# Patient Record
Sex: MALE | Race: WHITE | NOT HISPANIC OR LATINO | URBAN - METROPOLITAN AREA
[De-identification: names, ages, dates, MRNs, and addresses within clinical notes are randomized per-mention and may not be internally consistent; named-entity substitution may affect disease eponyms.]

---

## 2017-07-18 ENCOUNTER — EMERGENCY (EMERGENCY)
Facility: HOSPITAL | Age: 72
LOS: 1 days | Discharge: HOME | End: 2017-07-18
Attending: EMERGENCY MEDICINE
Payer: MEDICARE

## 2017-07-18 VITALS
RESPIRATION RATE: 16 BRPM | WEIGHT: 160.06 LBS | TEMPERATURE: 99 F | HEART RATE: 75 BPM | OXYGEN SATURATION: 97 % | HEIGHT: 68 IN | DIASTOLIC BLOOD PRESSURE: 75 MMHG | SYSTOLIC BLOOD PRESSURE: 147 MMHG

## 2017-07-18 PROCEDURE — G0168: CPT

## 2017-07-18 PROCEDURE — 99284 EMERGENCY DEPT VISIT MOD MDM: CPT | Mod: 25

## 2017-07-18 RX ORDER — TETANUS TOXOID, REDUCED DIPHTHERIA TOXOID AND ACELLULAR PERTUSSIS VACCINE, ADSORBED 5; 2.5; 8; 8; 2.5 [IU]/.5ML; [IU]/.5ML; UG/.5ML; UG/.5ML; UG/.5ML
0.5 SUSPENSION INTRAMUSCULAR ONCE
Qty: 0 | Refills: 0 | Status: COMPLETED | OUTPATIENT
Start: 2017-07-18 | End: 2017-07-18

## 2017-07-18 NOTE — ED ADULT TRIAGE NOTE - CHIEF COMPLAINT QUOTE
As Per EMS pt was trying to walk with assistance of son and fell. Pt denies dizziness. Lac noted under right eye and above right eye. pt Awake and alert.

## 2017-07-19 VITALS
DIASTOLIC BLOOD PRESSURE: 88 MMHG | RESPIRATION RATE: 16 BRPM | TEMPERATURE: 98 F | OXYGEN SATURATION: 97 % | HEART RATE: 72 BPM | SYSTOLIC BLOOD PRESSURE: 138 MMHG

## 2017-07-19 LAB
ALBUMIN SERPL ELPH-MCNC: 4 G/DL — SIGNIFICANT CHANGE UP (ref 3.3–5.2)
ALP SERPL-CCNC: 98 U/L — SIGNIFICANT CHANGE UP (ref 40–120)
ALT FLD-CCNC: 25 U/L — SIGNIFICANT CHANGE UP
ANION GAP SERPL CALC-SCNC: 11 MMOL/L — SIGNIFICANT CHANGE UP (ref 5–17)
AST SERPL-CCNC: 30 U/L — SIGNIFICANT CHANGE UP
BASOPHILS # BLD AUTO: 0 K/UL — SIGNIFICANT CHANGE UP (ref 0–0.2)
BASOPHILS NFR BLD AUTO: 0.2 % — SIGNIFICANT CHANGE UP (ref 0–2)
BILIRUB SERPL-MCNC: 0.3 MG/DL — LOW (ref 0.4–2)
BUN SERPL-MCNC: 17 MG/DL — SIGNIFICANT CHANGE UP (ref 8–20)
CALCIUM SERPL-MCNC: 8.9 MG/DL — SIGNIFICANT CHANGE UP (ref 8.6–10.2)
CHLORIDE SERPL-SCNC: 99 MMOL/L — SIGNIFICANT CHANGE UP (ref 98–107)
CO2 SERPL-SCNC: 32 MMOL/L — HIGH (ref 22–29)
CREAT SERPL-MCNC: 0.31 MG/DL — LOW (ref 0.5–1.3)
EOSINOPHIL # BLD AUTO: 0.3 K/UL — SIGNIFICANT CHANGE UP (ref 0–0.5)
EOSINOPHIL NFR BLD AUTO: 3.8 % — SIGNIFICANT CHANGE UP (ref 0–5)
GLUCOSE SERPL-MCNC: 134 MG/DL — HIGH (ref 70–115)
HCT VFR BLD CALC: 45.5 % — SIGNIFICANT CHANGE UP (ref 42–52)
HGB BLD-MCNC: 14.2 G/DL — SIGNIFICANT CHANGE UP (ref 14–18)
LYMPHOCYTES # BLD AUTO: 1 K/UL — SIGNIFICANT CHANGE UP (ref 1–4.8)
LYMPHOCYTES # BLD AUTO: 12 % — LOW (ref 20–55)
MCHC RBC-ENTMCNC: 30.1 PG — SIGNIFICANT CHANGE UP (ref 27–31)
MCHC RBC-ENTMCNC: 31.2 G/DL — LOW (ref 32–36)
MCV RBC AUTO: 96.6 FL — HIGH (ref 80–94)
MONOCYTES # BLD AUTO: 0.8 K/UL — SIGNIFICANT CHANGE UP (ref 0–0.8)
MONOCYTES NFR BLD AUTO: 9.8 % — SIGNIFICANT CHANGE UP (ref 3–10)
NEUTROPHILS # BLD AUTO: 6.2 K/UL — SIGNIFICANT CHANGE UP (ref 1.8–8)
NEUTROPHILS NFR BLD AUTO: 74 % — HIGH (ref 37–73)
PLATELET # BLD AUTO: 214 K/UL — SIGNIFICANT CHANGE UP (ref 150–400)
POTASSIUM SERPL-MCNC: 4 MMOL/L — SIGNIFICANT CHANGE UP (ref 3.5–5.3)
POTASSIUM SERPL-SCNC: 4 MMOL/L — SIGNIFICANT CHANGE UP (ref 3.5–5.3)
PROT SERPL-MCNC: 7.8 G/DL — SIGNIFICANT CHANGE UP (ref 6.6–8.7)
RBC # BLD: 4.71 M/UL — SIGNIFICANT CHANGE UP (ref 4.6–6.2)
RBC # FLD: 14.4 % — SIGNIFICANT CHANGE UP (ref 11–15.6)
SODIUM SERPL-SCNC: 142 MMOL/L — SIGNIFICANT CHANGE UP (ref 135–145)
WBC # BLD: 8.4 K/UL — SIGNIFICANT CHANGE UP (ref 4.8–10.8)
WBC # FLD AUTO: 8.4 K/UL — SIGNIFICANT CHANGE UP (ref 4.8–10.8)

## 2017-07-19 PROCEDURE — 99284 EMERGENCY DEPT VISIT MOD MDM: CPT | Mod: 25

## 2017-07-19 PROCEDURE — 70450 CT HEAD/BRAIN W/O DYE: CPT | Mod: 26

## 2017-07-19 PROCEDURE — 85027 COMPLETE CBC AUTOMATED: CPT

## 2017-07-19 PROCEDURE — 90471 IMMUNIZATION ADMIN: CPT

## 2017-07-19 PROCEDURE — 90715 TDAP VACCINE 7 YRS/> IM: CPT

## 2017-07-19 PROCEDURE — 80053 COMPREHEN METABOLIC PANEL: CPT

## 2017-07-19 PROCEDURE — 36415 COLL VENOUS BLD VENIPUNCTURE: CPT

## 2017-07-19 PROCEDURE — 70450 CT HEAD/BRAIN W/O DYE: CPT

## 2017-07-19 PROCEDURE — 12013 RPR F/E/E/N/L/M 2.6-5.0 CM: CPT

## 2017-07-19 RX ADMIN — TETANUS TOXOID, REDUCED DIPHTHERIA TOXOID AND ACELLULAR PERTUSSIS VACCINE, ADSORBED 0.5 MILLILITER(S): 5; 2.5; 8; 8; 2.5 SUSPENSION INTRAMUSCULAR at 00:52

## 2017-07-19 NOTE — ED PROVIDER NOTE - MEDICAL DECISION MAKING DETAILS
A 71 year old male pt presents to the ED s/p fall. Will CT head, repair laceration, and re-evaluate.

## 2017-07-19 NOTE — ED PROVIDER NOTE - PROGRESS NOTE DETAILS
labs and imaging reviewed.  wound closed.   d/c home with family  pt at baseline. son states pt fell and then passed out.

## 2017-07-19 NOTE — ED PROVIDER NOTE - OBJECTIVE STATEMENT
A 71 year old male pt with a hx of muscular dystrophy presents to the ED s/p fall. 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 Today, the pt was on his toilet when he states that the next thing he knows he was on the ground. He is unsure if he experienced LOC but did hit his head, suffering a laceration to the right side of his head. Pt is unsure if he is on blood thinners but does have a hx of stent placement. No further complaints at this time.

## 2017-07-19 NOTE — ED PROCEDURE NOTE - PROCEDURE ADDITIONAL DETAILS
five 5.0 chromic gut placed with dermabond applied to the corner of the wound as it was a flap shaped lac at the tail end

## 2017-07-19 NOTE — ED ADULT NURSE NOTE - OBJECTIVE STATEMENT
pt A&ox4, son at bedside, as per son pt got up to go to the bathroom and got dizzy and fell and hit his head pt thinks he had LOC, resp even and unlabored no distress noted, pt a poor historian unable to give medical hx, pt has lac to above right eye

## 2019-01-01 ENCOUNTER — INPATIENT (INPATIENT)
Facility: HOSPITAL | Age: 74
LOS: 1 days | DRG: 189 | End: 2019-06-29
Attending: INTERNAL MEDICINE | Admitting: HOSPITALIST
Payer: MEDICARE

## 2019-01-01 VITALS
HEIGHT: 72 IN | TEMPERATURE: 98 F | HEART RATE: 78 BPM | SYSTOLIC BLOOD PRESSURE: 105 MMHG | DIASTOLIC BLOOD PRESSURE: 68 MMHG | WEIGHT: 149.91 LBS | RESPIRATION RATE: 22 BRPM | OXYGEN SATURATION: 79 %

## 2019-01-01 DIAGNOSIS — J96.01 ACUTE RESPIRATORY FAILURE WITH HYPOXIA: ICD-10-CM

## 2019-01-01 DIAGNOSIS — G71.00 MUSCULAR DYSTROPHY, UNSPECIFIED: ICD-10-CM

## 2019-01-01 DIAGNOSIS — I10 ESSENTIAL (PRIMARY) HYPERTENSION: ICD-10-CM

## 2019-01-01 DIAGNOSIS — I95.9 HYPOTENSION, UNSPECIFIED: ICD-10-CM

## 2019-01-01 DIAGNOSIS — J96.91 RESPIRATORY FAILURE, UNSPECIFIED WITH HYPOXIA: ICD-10-CM

## 2019-01-01 DIAGNOSIS — E43 UNSPECIFIED SEVERE PROTEIN-CALORIE MALNUTRITION: ICD-10-CM

## 2019-01-01 DIAGNOSIS — G93.40 ENCEPHALOPATHY, UNSPECIFIED: ICD-10-CM

## 2019-01-01 DIAGNOSIS — E46 UNSPECIFIED PROTEIN-CALORIE MALNUTRITION: ICD-10-CM

## 2019-01-01 DIAGNOSIS — E87.1 HYPO-OSMOLALITY AND HYPONATREMIA: ICD-10-CM

## 2019-01-01 DIAGNOSIS — Z71.89 OTHER SPECIFIED COUNSELING: ICD-10-CM

## 2019-01-01 LAB
ALBUMIN SERPL ELPH-MCNC: 3.7 G/DL — SIGNIFICANT CHANGE UP (ref 3.3–5.2)
ALP SERPL-CCNC: 91 U/L — SIGNIFICANT CHANGE UP (ref 40–120)
ALT FLD-CCNC: 13 U/L — SIGNIFICANT CHANGE UP
ANION GAP SERPL CALC-SCNC: 12 MMOL/L — SIGNIFICANT CHANGE UP (ref 5–17)
ANION GAP SERPL CALC-SCNC: 8 MMOL/L — SIGNIFICANT CHANGE UP (ref 5–17)
ANISOCYTOSIS BLD QL: SLIGHT — SIGNIFICANT CHANGE UP
APPEARANCE UR: CLEAR — SIGNIFICANT CHANGE UP
APTT BLD: 24.4 SEC — LOW (ref 27.5–36.3)
AST SERPL-CCNC: 28 U/L — SIGNIFICANT CHANGE UP
BASE EXCESS BLDA CALC-SCNC: 10 MMOL/L — HIGH (ref -2–2)
BASOPHILS # BLD AUTO: 0 K/UL — SIGNIFICANT CHANGE UP (ref 0–0.2)
BASOPHILS # BLD AUTO: 0.02 K/UL — SIGNIFICANT CHANGE UP (ref 0–0.2)
BASOPHILS NFR BLD AUTO: 0 % — SIGNIFICANT CHANGE UP (ref 0–2)
BASOPHILS NFR BLD AUTO: 0.3 % — SIGNIFICANT CHANGE UP (ref 0–2)
BILIRUB SERPL-MCNC: 0.5 MG/DL — SIGNIFICANT CHANGE UP (ref 0.4–2)
BILIRUB UR-MCNC: NEGATIVE — SIGNIFICANT CHANGE UP
BLOOD GAS COMMENTS ARTERIAL: SIGNIFICANT CHANGE UP
BUN SERPL-MCNC: 12 MG/DL — SIGNIFICANT CHANGE UP (ref 8–20)
BUN SERPL-MCNC: 8 MG/DL — SIGNIFICANT CHANGE UP (ref 8–20)
CALCIUM SERPL-MCNC: 8.2 MG/DL — LOW (ref 8.6–10.2)
CALCIUM SERPL-MCNC: 9.5 MG/DL — SIGNIFICANT CHANGE UP (ref 8.6–10.2)
CHLORIDE SERPL-SCNC: 74 MMOL/L — LOW (ref 98–107)
CHLORIDE SERPL-SCNC: 84 MMOL/L — LOW (ref 98–107)
CK SERPL-CCNC: 27 U/L — LOW (ref 30–200)
CO2 SERPL-SCNC: 36 MMOL/L — HIGH (ref 22–29)
CO2 SERPL-SCNC: 39 MMOL/L — HIGH (ref 22–29)
COLOR SPEC: YELLOW — SIGNIFICANT CHANGE UP
CREAT ?TM UR-MCNC: 11 MG/DL — SIGNIFICANT CHANGE UP
CREAT SERPL-MCNC: 0.23 MG/DL — LOW (ref 0.5–1.3)
CREAT SERPL-MCNC: <0.2 MG/DL — LOW (ref 0.5–1.3)
CULTURE RESULTS: NO GROWTH — SIGNIFICANT CHANGE UP
DIFF PNL FLD: ABNORMAL
EOSINOPHIL # BLD AUTO: 0 K/UL — SIGNIFICANT CHANGE UP (ref 0–0.5)
EOSINOPHIL # BLD AUTO: 0.03 K/UL — SIGNIFICANT CHANGE UP (ref 0–0.5)
EOSINOPHIL NFR BLD AUTO: 0 % — SIGNIFICANT CHANGE UP (ref 0–6)
EOSINOPHIL NFR BLD AUTO: 0.5 % — SIGNIFICANT CHANGE UP (ref 0–6)
EPI CELLS # UR: SIGNIFICANT CHANGE UP
GAS PNL BLDA: SIGNIFICANT CHANGE UP
GIANT PLATELETS BLD QL SMEAR: PRESENT — SIGNIFICANT CHANGE UP
GLUCOSE SERPL-MCNC: 130 MG/DL — HIGH (ref 70–115)
GLUCOSE SERPL-MCNC: 77 MG/DL — SIGNIFICANT CHANGE UP (ref 70–115)
GLUCOSE UR QL: 1000 MG/DL
HCO3 BLDA-SCNC: 34 MMOL/L — HIGH (ref 20–26)
HCT VFR BLD CALC: 48.1 % — SIGNIFICANT CHANGE UP (ref 39–50)
HCT VFR BLD CALC: 52.4 % — HIGH (ref 39–50)
HGB BLD-MCNC: 14.9 G/DL — SIGNIFICANT CHANGE UP (ref 13–17)
HGB BLD-MCNC: 16.6 G/DL — SIGNIFICANT CHANGE UP (ref 13–17)
HOROWITZ INDEX BLDA+IHG-RTO: SIGNIFICANT CHANGE UP
IMM GRANULOCYTES NFR BLD AUTO: 0.3 % — SIGNIFICANT CHANGE UP (ref 0–1.5)
INR BLD: 1.14 RATIO — SIGNIFICANT CHANGE UP (ref 0.88–1.16)
KETONES UR-MCNC: ABNORMAL
LACTATE BLDV-MCNC: 1.7 MMOL/L — SIGNIFICANT CHANGE UP (ref 0.5–2)
LEGIONELLA AG UR QL: NEGATIVE — SIGNIFICANT CHANGE UP
LEUKOCYTE ESTERASE UR-ACNC: NEGATIVE — SIGNIFICANT CHANGE UP
LYMPHOCYTES # BLD AUTO: 0.18 K/UL — LOW (ref 1–3.3)
LYMPHOCYTES # BLD AUTO: 0.65 K/UL — LOW (ref 1–3.3)
LYMPHOCYTES # BLD AUTO: 11.2 % — LOW (ref 13–44)
LYMPHOCYTES # BLD AUTO: 2.6 % — LOW (ref 13–44)
MACROCYTES BLD QL: SLIGHT — SIGNIFICANT CHANGE UP
MAGNESIUM SERPL-MCNC: 1.5 MG/DL — LOW (ref 1.6–2.6)
MAGNESIUM SERPL-MCNC: 1.6 MG/DL — SIGNIFICANT CHANGE UP (ref 1.6–2.6)
MANUAL SMEAR VERIFICATION: SIGNIFICANT CHANGE UP
MCHC RBC-ENTMCNC: 29.5 PG — SIGNIFICANT CHANGE UP (ref 27–34)
MCHC RBC-ENTMCNC: 29.7 PG — SIGNIFICANT CHANGE UP (ref 27–34)
MCHC RBC-ENTMCNC: 31 GM/DL — LOW (ref 32–36)
MCHC RBC-ENTMCNC: 31.7 GM/DL — LOW (ref 32–36)
MCV RBC AUTO: 93.9 FL — SIGNIFICANT CHANGE UP (ref 80–100)
MCV RBC AUTO: 95.2 FL — SIGNIFICANT CHANGE UP (ref 80–100)
MICROCYTES BLD QL: SLIGHT — SIGNIFICANT CHANGE UP
MONOCYTES # BLD AUTO: 0.66 K/UL — SIGNIFICANT CHANGE UP (ref 0–0.9)
MONOCYTES # BLD AUTO: 0.85 K/UL — SIGNIFICANT CHANGE UP (ref 0–0.9)
MONOCYTES NFR BLD AUTO: 11.3 % — SIGNIFICANT CHANGE UP (ref 2–14)
MONOCYTES NFR BLD AUTO: 12.1 % — SIGNIFICANT CHANGE UP (ref 2–14)
NEUTROPHILS # BLD AUTO: 4.44 K/UL — SIGNIFICANT CHANGE UP (ref 1.8–7.4)
NEUTROPHILS # BLD AUTO: 5.99 K/UL — SIGNIFICANT CHANGE UP (ref 1.8–7.4)
NEUTROPHILS NFR BLD AUTO: 76.4 % — SIGNIFICANT CHANGE UP (ref 43–77)
NEUTROPHILS NFR BLD AUTO: 85.3 % — HIGH (ref 43–77)
NITRITE UR-MCNC: NEGATIVE — SIGNIFICANT CHANGE UP
NT-PROBNP SERPL-SCNC: 2545 PG/ML — HIGH (ref 0–300)
OSMOLALITY UR: 182 MOSM/KG — LOW (ref 300–1000)
PCO2 BLDA: 90 MMHG — CRITICAL HIGH (ref 35–45)
PH BLDA: 7.27 — LOW (ref 7.35–7.45)
PH UR: 5 — SIGNIFICANT CHANGE UP (ref 5–8)
PHOSPHATE SERPL-MCNC: 3.1 MG/DL — SIGNIFICANT CHANGE UP (ref 2.4–4.7)
PHOSPHATE SERPL-MCNC: 3.5 MG/DL — SIGNIFICANT CHANGE UP (ref 2.4–4.7)
PLAT MORPH BLD: ABNORMAL
PLATELET # BLD AUTO: 243 K/UL — SIGNIFICANT CHANGE UP (ref 150–400)
PLATELET # BLD AUTO: 260 K/UL — SIGNIFICANT CHANGE UP (ref 150–400)
PLATELET COUNT - ESTIMATE: NORMAL — SIGNIFICANT CHANGE UP
PO2 BLDA: 96 MMHG — SIGNIFICANT CHANGE UP (ref 83–108)
POIKILOCYTOSIS BLD QL AUTO: SLIGHT — SIGNIFICANT CHANGE UP
POTASSIUM SERPL-MCNC: 4.6 MMOL/L — SIGNIFICANT CHANGE UP (ref 3.5–5.3)
POTASSIUM SERPL-MCNC: 5.6 MMOL/L — HIGH (ref 3.5–5.3)
POTASSIUM SERPL-SCNC: 4.6 MMOL/L — SIGNIFICANT CHANGE UP (ref 3.5–5.3)
POTASSIUM SERPL-SCNC: 5.6 MMOL/L — HIGH (ref 3.5–5.3)
PROCALCITONIN SERPL-MCNC: 0.04 NG/ML — SIGNIFICANT CHANGE UP (ref 0.02–0.1)
PROT SERPL-MCNC: 7.3 G/DL — SIGNIFICANT CHANGE UP (ref 6.6–8.7)
PROT UR-MCNC: 30 MG/DL
PROTHROM AB SERPL-ACNC: 13.2 SEC — HIGH (ref 10–12.9)
RAPID RVP RESULT: SIGNIFICANT CHANGE UP
RBC # BLD: 5.05 M/UL — SIGNIFICANT CHANGE UP (ref 4.2–5.8)
RBC # BLD: 5.58 M/UL — SIGNIFICANT CHANGE UP (ref 4.2–5.8)
RBC # FLD: 12.6 % — SIGNIFICANT CHANGE UP (ref 10.3–14.5)
RBC # FLD: 12.9 % — SIGNIFICANT CHANGE UP (ref 10.3–14.5)
RBC BLD AUTO: ABNORMAL
RBC CASTS # UR COMP ASSIST: SIGNIFICANT CHANGE UP /HPF (ref 0–4)
SAO2 % BLDA: 97 % — SIGNIFICANT CHANGE UP (ref 95–99)
SODIUM SERPL-SCNC: 121 MMOL/L — LOW (ref 135–145)
SODIUM SERPL-SCNC: 132 MMOL/L — LOW (ref 135–145)
SODIUM UR-SCNC: <30 MMOL/L — SIGNIFICANT CHANGE UP
SP GR SPEC: 1.01 — SIGNIFICANT CHANGE UP (ref 1.01–1.02)
SPECIMEN SOURCE: SIGNIFICANT CHANGE UP
TROPONIN T SERPL-MCNC: 0.45 NG/ML — HIGH (ref 0–0.06)
TROPONIN T SERPL-MCNC: 0.55 NG/ML — HIGH (ref 0–0.06)
UROBILINOGEN FLD QL: NEGATIVE MG/DL — SIGNIFICANT CHANGE UP
WBC # BLD: 5.82 K/UL — SIGNIFICANT CHANGE UP (ref 3.8–10.5)
WBC # BLD: 7.02 K/UL — SIGNIFICANT CHANGE UP (ref 3.8–10.5)
WBC # FLD AUTO: 5.82 K/UL — SIGNIFICANT CHANGE UP (ref 3.8–10.5)
WBC # FLD AUTO: 7.02 K/UL — SIGNIFICANT CHANGE UP (ref 3.8–10.5)
WBC UR QL: SIGNIFICANT CHANGE UP

## 2019-01-01 PROCEDURE — 96375 TX/PRO/DX INJ NEW DRUG ADDON: CPT | Mod: XU

## 2019-01-01 PROCEDURE — 82550 ASSAY OF CK (CPK): CPT

## 2019-01-01 PROCEDURE — 85014 HEMATOCRIT: CPT

## 2019-01-01 PROCEDURE — 82947 ASSAY GLUCOSE BLOOD QUANT: CPT

## 2019-01-01 PROCEDURE — 82330 ASSAY OF CALCIUM: CPT

## 2019-01-01 PROCEDURE — 96374 THER/PROPH/DIAG INJ IV PUSH: CPT | Mod: XU

## 2019-01-01 PROCEDURE — 82435 ASSAY OF BLOOD CHLORIDE: CPT

## 2019-01-01 PROCEDURE — 36600 WITHDRAWAL OF ARTERIAL BLOOD: CPT

## 2019-01-01 PROCEDURE — 93010 ELECTROCARDIOGRAM REPORT: CPT

## 2019-01-01 PROCEDURE — 83735 ASSAY OF MAGNESIUM: CPT

## 2019-01-01 PROCEDURE — 99291 CRITICAL CARE FIRST HOUR: CPT

## 2019-01-01 PROCEDURE — 84484 ASSAY OF TROPONIN QUANT: CPT

## 2019-01-01 PROCEDURE — 87633 RESP VIRUS 12-25 TARGETS: CPT

## 2019-01-01 PROCEDURE — 51702 INSERT TEMP BLADDER CATH: CPT

## 2019-01-01 PROCEDURE — 87486 CHLMYD PNEUM DNA AMP PROBE: CPT

## 2019-01-01 PROCEDURE — 71045 X-RAY EXAM CHEST 1 VIEW: CPT | Mod: 26

## 2019-01-01 PROCEDURE — 87449 NOS EACH ORGANISM AG IA: CPT

## 2019-01-01 PROCEDURE — 87798 DETECT AGENT NOS DNA AMP: CPT

## 2019-01-01 PROCEDURE — 99291 CRITICAL CARE FIRST HOUR: CPT | Mod: 25

## 2019-01-01 PROCEDURE — 94760 N-INVAS EAR/PLS OXIMETRY 1: CPT

## 2019-01-01 PROCEDURE — 36415 COLL VENOUS BLD VENIPUNCTURE: CPT

## 2019-01-01 PROCEDURE — 85610 PROTHROMBIN TIME: CPT

## 2019-01-01 PROCEDURE — 12345: CPT | Mod: NC

## 2019-01-01 PROCEDURE — 83935 ASSAY OF URINE OSMOLALITY: CPT

## 2019-01-01 PROCEDURE — 80048 BASIC METABOLIC PNL TOTAL CA: CPT

## 2019-01-01 PROCEDURE — 84145 PROCALCITONIN (PCT): CPT

## 2019-01-01 PROCEDURE — 84132 ASSAY OF SERUM POTASSIUM: CPT

## 2019-01-01 PROCEDURE — 87086 URINE CULTURE/COLONY COUNT: CPT

## 2019-01-01 PROCEDURE — 87581 M.PNEUMON DNA AMP PROBE: CPT

## 2019-01-01 PROCEDURE — 81001 URINALYSIS AUTO W/SCOPE: CPT

## 2019-01-01 PROCEDURE — 93005 ELECTROCARDIOGRAM TRACING: CPT

## 2019-01-01 PROCEDURE — 84295 ASSAY OF SERUM SODIUM: CPT

## 2019-01-01 PROCEDURE — 87040 BLOOD CULTURE FOR BACTERIA: CPT

## 2019-01-01 PROCEDURE — 99232 SBSQ HOSP IP/OBS MODERATE 35: CPT

## 2019-01-01 PROCEDURE — 83880 ASSAY OF NATRIURETIC PEPTIDE: CPT

## 2019-01-01 PROCEDURE — 80053 COMPREHEN METABOLIC PANEL: CPT

## 2019-01-01 PROCEDURE — 82803 BLOOD GASES ANY COMBINATION: CPT

## 2019-01-01 PROCEDURE — 84100 ASSAY OF PHOSPHORUS: CPT

## 2019-01-01 PROCEDURE — 85027 COMPLETE CBC AUTOMATED: CPT

## 2019-01-01 PROCEDURE — 99497 ADVNCD CARE PLAN 30 MIN: CPT | Mod: 25

## 2019-01-01 PROCEDURE — 84300 ASSAY OF URINE SODIUM: CPT

## 2019-01-01 PROCEDURE — 85730 THROMBOPLASTIN TIME PARTIAL: CPT

## 2019-01-01 PROCEDURE — 71045 X-RAY EXAM CHEST 1 VIEW: CPT

## 2019-01-01 PROCEDURE — 82570 ASSAY OF URINE CREATININE: CPT

## 2019-01-01 PROCEDURE — 83605 ASSAY OF LACTIC ACID: CPT

## 2019-01-01 PROCEDURE — 94660 CPAP INITIATION&MGMT: CPT

## 2019-01-01 PROCEDURE — 99223 1ST HOSP IP/OBS HIGH 75: CPT

## 2019-01-01 RX ORDER — SODIUM CHLORIDE 9 MG/ML
1000 INJECTION INTRAMUSCULAR; INTRAVENOUS; SUBCUTANEOUS
Refills: 0 | Status: DISCONTINUED | OUTPATIENT
Start: 2019-01-01 | End: 2019-01-01

## 2019-01-01 RX ORDER — SODIUM CHLORIDE 9 MG/ML
2100 INJECTION INTRAMUSCULAR; INTRAVENOUS; SUBCUTANEOUS ONCE
Refills: 0 | Status: COMPLETED | OUTPATIENT
Start: 2019-01-01 | End: 2019-01-01

## 2019-01-01 RX ORDER — PIPERACILLIN AND TAZOBACTAM 4; .5 G/20ML; G/20ML
3.38 INJECTION, POWDER, LYOPHILIZED, FOR SOLUTION INTRAVENOUS ONCE
Refills: 0 | Status: COMPLETED | OUTPATIENT
Start: 2019-01-01 | End: 2019-01-01

## 2019-01-01 RX ORDER — ENOXAPARIN SODIUM 100 MG/ML
40 INJECTION SUBCUTANEOUS DAILY
Refills: 0 | Status: DISCONTINUED | OUTPATIENT
Start: 2019-01-01 | End: 2019-01-01

## 2019-01-01 RX ORDER — CIPROFLOXACIN HCL 0.3 %
1 DROPS OPHTHALMIC (EYE)
Refills: 0 | Status: DISCONTINUED | OUTPATIENT
Start: 2019-01-01 | End: 2019-01-01

## 2019-01-01 RX ORDER — MORPHINE SULFATE 50 MG/1
1 CAPSULE, EXTENDED RELEASE ORAL
Refills: 0 | Status: DISCONTINUED | OUTPATIENT
Start: 2019-01-01 | End: 2019-01-01

## 2019-01-01 RX ORDER — CHLORHEXIDINE GLUCONATE 213 G/1000ML
1 SOLUTION TOPICAL
Refills: 0 | Status: DISCONTINUED | OUTPATIENT
Start: 2019-01-01 | End: 2019-01-01

## 2019-01-01 RX ORDER — MORPHINE SULFATE 50 MG/1
2 CAPSULE, EXTENDED RELEASE ORAL ONCE
Refills: 0 | Status: DISCONTINUED | OUTPATIENT
Start: 2019-01-01 | End: 2019-01-01

## 2019-01-01 RX ORDER — MAGNESIUM SULFATE 500 MG/ML
2 VIAL (ML) INJECTION ONCE
Refills: 0 | Status: COMPLETED | OUTPATIENT
Start: 2019-01-01 | End: 2019-01-01

## 2019-01-01 RX ORDER — SCOPALAMINE 1 MG/3D
1 PATCH, EXTENDED RELEASE TRANSDERMAL
Refills: 0 | Status: DISCONTINUED | OUTPATIENT
Start: 2019-01-01 | End: 2019-01-01

## 2019-01-01 RX ORDER — MORPHINE SULFATE 50 MG/1
2 CAPSULE, EXTENDED RELEASE ORAL
Refills: 0 | Status: DISCONTINUED | OUTPATIENT
Start: 2019-01-01 | End: 2019-01-01

## 2019-01-01 RX ORDER — SODIUM CHLORIDE 9 MG/ML
1000 INJECTION, SOLUTION INTRAVENOUS ONCE
Refills: 0 | Status: COMPLETED | OUTPATIENT
Start: 2019-01-01 | End: 2019-01-01

## 2019-01-01 RX ORDER — FOLIC ACID 0.8 MG
1 TABLET ORAL
Qty: 0 | Refills: 0 | DISCHARGE

## 2019-01-01 RX ORDER — METOPROLOL TARTRATE 50 MG
1 TABLET ORAL
Qty: 0 | Refills: 0 | DISCHARGE

## 2019-01-01 RX ORDER — METOPROLOL TARTRATE 50 MG
5 TABLET ORAL EVERY 6 HOURS
Refills: 0 | Status: DISCONTINUED | OUTPATIENT
Start: 2019-01-01 | End: 2019-01-01

## 2019-01-01 RX ORDER — AZITHROMYCIN 500 MG/1
500 TABLET, FILM COATED ORAL EVERY 24 HOURS
Refills: 0 | Status: DISCONTINUED | OUTPATIENT
Start: 2019-01-01 | End: 2019-01-01

## 2019-01-01 RX ORDER — AMPICILLIN SODIUM AND SULBACTAM SODIUM 250; 125 MG/ML; MG/ML
3 INJECTION, POWDER, FOR SUSPENSION INTRAMUSCULAR; INTRAVENOUS EVERY 6 HOURS
Refills: 0 | Status: DISCONTINUED | OUTPATIENT
Start: 2019-01-01 | End: 2019-01-01

## 2019-01-01 RX ORDER — VANCOMYCIN HCL 1 G
1000 VIAL (EA) INTRAVENOUS ONCE
Refills: 0 | Status: COMPLETED | OUTPATIENT
Start: 2019-01-01 | End: 2019-01-01

## 2019-01-01 RX ADMIN — MORPHINE SULFATE 2 MILLIGRAM(S): 50 CAPSULE, EXTENDED RELEASE ORAL at 16:56

## 2019-01-01 RX ADMIN — AMPICILLIN SODIUM AND SULBACTAM SODIUM 200 GRAM(S): 250; 125 INJECTION, POWDER, FOR SUSPENSION INTRAMUSCULAR; INTRAVENOUS at 20:07

## 2019-01-01 RX ADMIN — SODIUM CHLORIDE 20 MILLILITER(S): 9 INJECTION INTRAMUSCULAR; INTRAVENOUS; SUBCUTANEOUS at 16:57

## 2019-01-01 RX ADMIN — PIPERACILLIN AND TAZOBACTAM 200 GRAM(S): 4; .5 INJECTION, POWDER, LYOPHILIZED, FOR SOLUTION INTRAVENOUS at 14:51

## 2019-01-01 RX ADMIN — AZITHROMYCIN 255 MILLIGRAM(S): 500 TABLET, FILM COATED ORAL at 19:44

## 2019-01-01 RX ADMIN — Medication 1 DROP(S): at 05:48

## 2019-01-01 RX ADMIN — ENOXAPARIN SODIUM 40 MILLIGRAM(S): 100 INJECTION SUBCUTANEOUS at 12:14

## 2019-01-01 RX ADMIN — SODIUM CHLORIDE 75 MILLILITER(S): 9 INJECTION INTRAMUSCULAR; INTRAVENOUS; SUBCUTANEOUS at 20:08

## 2019-01-01 RX ADMIN — ENOXAPARIN SODIUM 40 MILLIGRAM(S): 100 INJECTION SUBCUTANEOUS at 18:06

## 2019-01-01 RX ADMIN — AMPICILLIN SODIUM AND SULBACTAM SODIUM 200 GRAM(S): 250; 125 INJECTION, POWDER, FOR SUSPENSION INTRAMUSCULAR; INTRAVENOUS at 02:26

## 2019-01-01 RX ADMIN — SODIUM CHLORIDE 2100 MILLILITER(S): 9 INJECTION INTRAMUSCULAR; INTRAVENOUS; SUBCUTANEOUS at 14:51

## 2019-01-01 RX ADMIN — SODIUM CHLORIDE 2000 MILLILITER(S): 9 INJECTION, SOLUTION INTRAVENOUS at 18:00

## 2019-01-01 RX ADMIN — SODIUM CHLORIDE 75 MILLILITER(S): 9 INJECTION INTRAMUSCULAR; INTRAVENOUS; SUBCUTANEOUS at 05:49

## 2019-01-01 RX ADMIN — Medication 50 GRAM(S): at 09:30

## 2019-01-01 RX ADMIN — Medication 50 GRAM(S): at 05:49

## 2019-01-01 RX ADMIN — Medication 250 MILLIGRAM(S): at 14:54

## 2019-01-01 RX ADMIN — Medication 1 MILLIGRAM(S): at 16:56

## 2019-01-01 RX ADMIN — MORPHINE SULFATE 2 MILLIGRAM(S): 50 CAPSULE, EXTENDED RELEASE ORAL at 18:25

## 2019-01-01 RX ADMIN — SODIUM CHLORIDE 75 MILLILITER(S): 9 INJECTION INTRAMUSCULAR; INTRAVENOUS; SUBCUTANEOUS at 18:07

## 2019-01-01 RX ADMIN — CHLORHEXIDINE GLUCONATE 1 APPLICATION(S): 213 SOLUTION TOPICAL at 18:08

## 2019-01-01 RX ADMIN — AMPICILLIN SODIUM AND SULBACTAM SODIUM 200 GRAM(S): 250; 125 INJECTION, POWDER, FOR SUSPENSION INTRAMUSCULAR; INTRAVENOUS at 08:25

## 2019-01-01 RX ADMIN — CHLORHEXIDINE GLUCONATE 1 APPLICATION(S): 213 SOLUTION TOPICAL at 05:50

## 2019-01-01 RX ADMIN — SODIUM CHLORIDE 75 MILLILITER(S): 9 INJECTION INTRAMUSCULAR; INTRAVENOUS; SUBCUTANEOUS at 08:25

## 2019-06-27 PROBLEM — G71.0 MUSCULAR DYSTROPHY: Chronic | Status: ACTIVE | Noted: 2017-07-19

## 2019-06-27 NOTE — H&P ADULT - HISTORY OF PRESENT ILLNESS
Pt is a 74 y/o male with PMHx HTN, CAD (stent in past, unknown amount and location), hx of fall with questionable ICH vs TBI presents to [facility] ED after 1 week of loss of voice, eye drainage, decreased PO intake, worsening dyspnea, fatigue and overall failure to thrive. History provided by son as pt is both hard of hearing and has completely lost voice over past week. Son endorses that patient has been "barely eating" this past week, and when patient does get up from bed to eat, he only eats a bite or two and then goes back to bed. At baseline, pt is A&Ox3, lives at home with son and is fully independent. According to son, pt has never been hospitalized for respiratory issues in past and denies any history of intubations. Son has been communicating with pt via hand signals. As per son, pt hasn't c/o any recent illness, viral symptoms, cough, CP, h/a, n/v/d, and has been afebrile at home. Pt is a 74 y/o male with PMHx HTN, CAD (stent in past, unknown amount and location), hx of fall with questionable ICH vs TBI, and Muscular Dystrophy presents to [facility] ED after 1 week of loss of voice, eye drainage, decreased PO intake, worsening dyspnea, fatigue and overall failure to thrive. History provided by son as pt is both hard of hearing and has completely lost voice over past week. Son endorses that patient has been "barely eating" this past week, and when patient does get up from bed to eat, he only eats a bite or two and then goes back to bed. At baseline, pt is A&Ox3, lives at home with son and is fully independent. According to son, pt has never been hospitalized for respiratory issues in past and denies any history of intubations. Son has been communicating with pt via hand signals. As per son, pt hasn't c/o any recent illness, viral symptoms, cough, CP, h/a, n/v/d, and has been afebrile at home. Pt is a 72 y/o male with PMHx HTN, CAD s/p PCI, hx of fall with questionable ICH vs TBI per son, and Muscular Dystrophy presents to [facility] ED after 1 week of loss of voice, eye drainage, decreased PO intake, worsening dyspnea, fatigue and overall failure to thrive. History provided by son as pt is both hard of hearing and has completely lost voice over past week. Son endorses that patient has been "barely eating" this past week, and when patient does get up from bed to eat, he only eats a bite or two and then goes back to bed. At baseline, pt is A&Ox3, lives at home with son and is fully independent. According to son, pt has never been hospitalized for respiratory issues in past and denies any history of intubations. Son has been communicating with pt via hand signals. As per son, pt hasn't c/o any recent illness, viral symptoms, cough, CP, h/a, n/v/d, and has been afebrile at home.     In ED he was found to be hypoxic in the 60s, placed on nonrebreather, then ABG showed partially compensated hypercapnic respiratory failure. Patient placed on bipap and failed to improve.

## 2019-06-27 NOTE — CHART NOTE - NSCHARTNOTEFT_GEN_A_CORE
MICU attending attestation to H&P:     74 y/o male with PMHx HTN, CAD s/p PCI, hx of fall with questionable ICH vs TBI per son, and Muscular Dystrophy presents to [facility] ED after 1 week of loss of voice, eye drainage, decreased PO intake, worsening dyspnea, fatigue now with acute hypoxic/hypercapnic respiratory failure ruling out sepsis   Empiric IV Unasyn and Azithromycin; sent for blood cx, RVP, legionella Ag, unsure if aspiration (needs swallow eval)  Improved hypercapnic resp failure on BiPAP; setting decreased since last ABG to 16/6 at 30% with back up rate of 20 with plan to repeat ABG at midnight and in AM; Will liberate from BiPAP in AM if ABG at midnight is ok  Needs Palliative care eval for long term

## 2019-06-27 NOTE — ED PROVIDER NOTE - CLINICAL SUMMARY MEDICAL DECISION MAKING FREE TEXT BOX
hx muscular dystrophy, hypoventilation, failure to thrive, afebrile, loss of voice, likely progression of disease, eval for metabolic and infectious. started on bipap for co2 99 with pH 7.28 (bicar 38 so somewhat acute on chronic). micu consult, abx, fluids.

## 2019-06-27 NOTE — ED ADULT NURSE NOTE - NSIMPLEMENTINTERV_GEN_ALL_ED
Implemented All Fall with Harm Risk Interventions:  [place] to call system. Call bell, personal items and telephone within reach. Instruct patient to call for assistance. Room bathroom lighting operational. Non-slip footwear when patient is off stretcher. Physically safe environment: no spills, clutter or unnecessary equipment. Stretcher in lowest position, wheels locked, appropriate side rails in place. Provide visual cue, wrist band, yellow gown, etc. Monitor gait and stability. Monitor for mental status changes and reorient to person, place, and time. Review medications for side effects contributing to fall risk. Reinforce activity limits and safety measures with patient and family. Provide visual clues: red socks.

## 2019-06-27 NOTE — PROCEDURE NOTE - ADDITIONAL PROCEDURE DETAILS
diagnosis: acute hypercapnic respiratory failure  proceudre performed independent of documented critical care time

## 2019-06-27 NOTE — ED PROVIDER NOTE - PROGRESS NOTE DETAILS
Audra: pt seen and accepted by ICU. feels some improvement on bipap, to repeat blood gas. HD stable and not hypoxic. discussion with family about pt's overall status/long term prognosis.

## 2019-06-27 NOTE — ED ADULT TRIAGE NOTE - CHIEF COMPLAINT QUOTE
Pt with hx of Muscular dystrophy and presents with 1 week of failure to thrive, lost voice this week, and is noted to have purulent drainage to left eye.

## 2019-06-27 NOTE — H&P ADULT - PROBLEM SELECTOR PLAN 4
Likely from chronic malnourishment. Legionella studies sent. Gentle N/S as above. Trend BMP. Chronic. Will start on gentle IVF. N/S 75cc/hr. Replete lytes as needed. K>4, Phos>3, Mg>2. NPO for now on BiPAP.  -when off bipap will need speech and swallow eval. if remains on bipap will need NG tube to start tube feeds to prevent further malnourishment, caution with refeeding syndrome given history of poor po intake for a while.

## 2019-06-27 NOTE — H&P ADULT - NSHPPHYSICALEXAM_GEN_ALL_CORE
General: thin, cachectic, frail, malnourished and ill-appearing male, lying comfortably in stretcher in NAD, breathing comfortably on BiPAP  Head: Normocephalic, atraumatic.   EENT: Right eye partially stitched closed. Sclera white. PERRL, EOM intact. Secretions normal. no cervical lymphadenopathy  PULM: Breath sounds minimally auscultated in all lung fields b/l. Mild wheezing heard b/l. No rhonchi, or rales. No use of accessory muscles.  CVS: Regular rate and rhythm. No murmurs or rubs. Pulses 2+ on upper and lower extremities bilaterally.   GI: Distended, non tender with bowel sounds normoactive in all four quadrants. No tenderness to light or deep palpation.   Neuro: Strength 4/5 in upper and 3/5 in lower extremities. CN intact, no paresthesias.   Musculoskeletal: FROM in all four extremities. FROM of cervical spine. Patient able to move all digits and toes.   Skin: No lesions, rashes, ulcers. Extremities equally cool and dry bilaterally. General: thin, cachectic, frail, malnourished and ill-appearing male. in no acute distress but with limited interaction.   Head: Normocephalic, atraumatic.   EENT: Right eye partially stitched closed. Sclera white with purulent drainage bilaterally. PERRL, EOM intact. oral secretions normal. no cervical lymphadenopathy  PULM: Breath sounds severely diminished throughout lung fields and nearly absent at bases. Mild wheezing b/l. No rhonchi, or rales. No use of accessory muscles.  CVS: Regular rate and rhythm. No murmurs or rubs. Pulses 2+ on upper and lower extremities bilaterally.   GI: Distended, non tender with bowel sounds normoactive in all four quadrants. No tenderness to light or deep palpation. No CVAT.  Neuro: Strength 4/5 in upper and 3/5 in lower extremities. CN intact, no paresthesias. unable to assess full mental status due to current nonverbal state. Patient follows simple commands.  Musculoskeletal: moves all 4 extremities.   Skin: No lesions, rashes, ulcers. General: thin, cachectic, frail, malnourished and ill-appearing male. in no acute distress but with limited interaction.   Head: Normocephalic, atraumatic.   EENT: Right eye partially stitched closed. Sclera white with purulent drainage bilaterally. PERRL, EOM intact. oral secretions normal. no cervical lymphadenopathy  PULM: minimal chest wall expansion with inhalation. Breath sounds severely diminished throughout lung fields and nearly absent at bases. Mild wheezing b/l. No rhonchi, or rales. No use of accessory muscles.  CVS: Regular rate and rhythm. No murmurs or rubs. Pulses 2+ on upper and lower extremities bilaterally.   GI: Distended, non tender with bowel sounds normoactive in all four quadrants. No tenderness to light or deep palpation. No CVAT.  Neuro: Strength 4/5 in upper and 3/5 in lower extremities. CN intact, no paresthesias. unable to assess full mental status due to current nonverbal state. Patient follows simple commands.  Musculoskeletal: moves all 4 extremities.   Skin: No lesions, rashes, ulcers.

## 2019-06-27 NOTE — H&P ADULT - PROBLEM SELECTOR PLAN 3
Chronic. Will start on gentle IVF. N/S 75cc/hr. Replete lytes as needed. K>4, Phos>3, Mg>2. NPO for now on BiPAP. Daily NIF/Vital capacity. Support respiratory function as above. Close monitoring for signs of respiratory compromise. Daily NIF/Vital capacity. NIF < 30 indicative of poor cough reflex and failure to protect airway significantly increasing risk for intubation. Support respiratory function as above. Close monitoring for signs of respiratory compromise.  -consult palliative care.

## 2019-06-27 NOTE — ED PROVIDER NOTE - OBJECTIVE STATEMENT
72 y/o male hx muscular dystrophy, htn, present with 1 week of failure to thrive, eating less, with left eye discharge, and loss of voice over past 1 week. Pt arrived satting in 60's, Pt awake and understands/expresses but unable to speak due to lost voice. Denies abd pain, headache, new neuro symptoms, cp. Pt never with cough, denies fever. Has been having more urinary accidents lately.     limited ros by verbal expression

## 2019-06-27 NOTE — H&P ADULT - NSHPSOCIALHISTORY_GEN_ALL_CORE
Lives at home with son, independent at baseline Lives at home with son, independent at baseline, denies tobacco, alcohol or illicit drug use

## 2019-06-27 NOTE — ED PROVIDER NOTE - PHYSICAL EXAMINATION
Gen: atrophied, awake,   HEENT: dry mucus membranes left eye partially sutured closed with discharge.   CV: RRR, nl s1/s2.  Resp: breathing ~16, somewhat shallow, can't hear good air movement.   GI: Abdomen soft, NT, ND. No rebound, no guarding  Neuro: Alert and oriented, follows commands, generalized weakness all extremities (little movement LE)  MSK: No spine or joint tenderness to palpation  Skin: No rashes intact, slightly pale

## 2019-06-27 NOTE — ED ADULT NURSE NOTE - OBJECTIVE STATEMENT
pt came to ED with son after not being able to eat or drink for 1 week, voice change/loss and difficulty breathing, and incontinence. Pt presents in triage with O2 sat 79% on room air. Pt brought straight to critical care room for eval by MD. Placed on nonrebreather and full sepsis workup complete. Pt O2 sat 100% on nonrebreather. Denies any other symptoms at this time. NSR on monitor. Safety maintained. Will continue to monitor.

## 2019-06-27 NOTE — H&P ADULT - COMMENTS
Unable to perform as pt is unable to speak/hard of hearing. Son provided recent history of general complaints. As per HPI.

## 2019-06-27 NOTE — H&P ADULT - ASSESSMENT
Pt is a 72 y/o male with PMHx HTN, CAD (stent in past, unknown amount and location), hx of fall with questionable ICH vs TBI now with acute hypoxic and hypercarbic respiratory failure secondary to progression of muscular dystrophy, as well as overall failure to thrive/malnourished state. 72 y/o male with PMHx HTN, CAD s/p PCI, hx of fall with questionable ICH vs TBI per son, and Muscular Dystrophy presents to [facility] ED after 1 week of loss of voice, eye drainage, decreased PO intake, worsening dyspnea, fatigue now with acute hypoxic/hypercapnic respiratory failure and failure to thrive secondary to progressive muscular dystrophy.        C conversation with patients son he states patient would not want to live in a "vegetative state" but would want trial intubation. I explained the natural course of muscular dystrophy and that in his father's scenario if he were intubated it would be very difficult to extubate him and he may require trach if not now, at some point in the future. Son states he plans to discuss with patient's sister.          45 minutes of critical care time spent providing medical care for patient's acute illness/conditions that impairs at least one vital organ system and/or poses a high risk of imminent or life threatening deterioration in the patient's condition. It includes time spent evaluating and treating the patient's acute illness as well as time spent reviewing labs, radiology, discussing goals of care with patient and/or patient's family, and discussing the case with a multidisciplinary team in an effort to prevent further life threatening deterioration or end organ damage. This time is independent of any procedures performed. 74 y/o male with PMHx HTN, CAD s/p PCI, hx of fall with questionable ICH vs TBI per son, and Muscular Dystrophy presents to [facility] ED after 1 week of loss of voice, eye drainage, decreased PO intake, worsening dyspnea, fatigue now with acute hypoxic/hypercapnic respiratory failure and failure to thrive secondary to progressive muscular dystrophy.        GOC conversation with patients son he states patient would not want to live in a "vegetative state" but would want trial intubation. I explained the natural course of muscular dystrophy and that in his father's scenario if he were intubated it would be very difficult to extubate him and he may require trach if not now, at some point in the future. Son states he plans to discuss with patient's sister.        Case discsused with ICU Dr. Krishnan.      45 minutes of critical care time spent providing medical care for patient's acute illness/conditions that impairs at least one vital organ system and/or poses a high risk of imminent or life threatening deterioration in the patient's condition. It includes time spent evaluating and treating the patient's acute illness as well as time spent reviewing labs, radiology, discussing goals of care with patient and/or patient's family, and discussing the case with a multidisciplinary team in an effort to prevent further life threatening deterioration or end organ damage. This time is independent of any procedures performed.

## 2019-06-27 NOTE — H&P ADULT - PROBLEM SELECTOR PLAN 5
Normotensive. Will hold home antihypertensive medications Likely from chronic malnourishment. Legionella studies sent. Trend BMP. send urine na, osm.

## 2019-06-27 NOTE — H&P ADULT - PROBLEM SELECTOR PLAN 1
Secondary to progression of muscular dystrophy. Initial ABG on NRB 7.29/99/234/38. ABG now on BiPAP remains acutely hypercarbic, but improving, 7.27/90/96/34. Although CXR official read shows no evidence of acute cardiopulmonary disease, still concerning for LLL PNA. Possible aspiration given history of increasing AMS/fatigue/somnelence. Secondary to progression of muscular dystrophy, acute on chronic hypoventilation. Initial ABG on NRB 7.29/99/234/38. ABG now on BiPAP remains acutely hypercarbic, 7.27/90/96/34. Although CXR official read shows no evidence of acute cardiopulmonary disease, still concerning for LLL PNA, especially in setting of dehydration/malnourishment. Possible aspiration given history of increasing AMS, fatigue, and somnelence. Will cover for CAP empirically on Azith for atypical, 500g daily and Unasyn 3g Q6h. Will check legionella studies. Full Sepsis w/u. Lactate normal. RVP and Pan cx's sent.  Repeat CXR in the morning. Will remain on BiPAP to support WOB and prevent further respiratory muscle fatigue. Up titration of BiPAP settings as new gas shows increasing respiratory acidosis despite decreased Co2. 20/6 @40% for now. Pt is high risk for respiratory collapse and possible intubation. Will admit to ICU for close monitoring. HOB >30 degrees. NPO for now. Procal sent. Secondary to progression of muscular dystrophy, acute on chronic hypoventilation. Initial ABG on NRB 7.29/99/234/38. ABG now on BiPAP remains acutely hypercarbic, 7.27/90/96/34. Although CXR official read shows no evidence of acute cardiopulmonary disease, still concerning for LLL PNA, especially in setting of dehydration/malnourishment. Possible aspiration given history of increasing AMS, fatigue, and somnelence. Will cover for CAP empirically on Azith for atypical, 500g daily and Unasyn 3g Q6h. Will check legionella studies. Full Sepsis w/u. Lactate normal. RVP and Pan cx's sent.  Repeat CXR in the morning. Will remain on BiPAP to support WOB and prevent further respiratory muscle fatigue. bipap settings adjusted to 20/6 @40% as f/u abg shows persistent hypercapnia. Actively titrating fio2 and peep to maintain spo2 > 92%. Pt is high risk for respiratory collapse and possible intubation. Will admit to ICU for close monitoring. HOB >30 degrees. NPO for now. Procal sent. Secondary to progression of muscular dystrophy, acute on chronic hypoventilation. Initial ABG on NRB 7.29/99/234/38. ABG now on BiPAP remains acutely hypercarbic, 7.27/90/96/34. Although CXR official read shows no evidence of acute cardiopulmonary disease, still concerning for LLL PNA, especially in setting of dehydration/malnourishment. Possible aspiration given history of increasing AMS, fatigue, and somnelence. Will cover for CAP empirically on Azith for atypical, 500g daily and Unasyn 3g Q6h. Will check legionella studies. Full Sepsis w/u. Lactate normal. RVP and Pan cx's sent.  Repeat CXR in the morning. Will remain on BiPAP to support WOB and prevent further respiratory muscle fatigue. bipap settings adjusted to 20/6 @40% as f/u abg shows persistent hypercapnia. Actively titrating fio2 and peep to maintain spo2 > 92%. Pt is high risk for respiratory collapse and possible intubation. Will admit to ICU for close monitoring. HOB >30 degrees. NPO for now. Procal sent.  -trop mildly elevated likely secondary to demand ischemia. no ischemic changes on EKG. will f/u cardiac enzymes with morning labs.

## 2019-06-27 NOTE — H&P ADULT - PROBLEM SELECTOR PLAN 2
Daily NIF/Vital capacity. Support respiratory function as above. Close monitoring for signs of respiratory compromise. -POCUS lungs a line predominant. poor cardiac windows, IVC visualized via abdominal view showed significant respiratory variation.   -bolused additional 1 liter IVF for 3100 liters IVF total. if remains hypotensive may need vasopressor support. Hypotension likely secondary to failure to thrive versus sepsis.

## 2019-06-28 NOTE — PROGRESS NOTE ADULT - PROBLEM SELECTOR PLAN 4
meds were held initially for low BP, would consider resuming meds may need to give IV vasotec and lopressor as pt is not awake enough to swallow

## 2019-06-28 NOTE — PROGRESS NOTE ADULT - SUBJECTIVE AND OBJECTIVE BOX
Patient is a 73y old  Male who presents with a chief complaint of Acute hypoxic/hypercarbic respiratory failure (2019 12:23)      BRIEF HOSPITAL COURSE: 72 y/o M with a h/o HTN, CAD s/p PCI, hx of fall with questionable ICH vs TBI per son, Muscular Dystrophy, admitted on  after 1 week of loss of voice, eye drainage, decreased PO intake, worsening dyspnea, fatigue and overall failure to thrive. Placed on NIPPV for acute hypercapnic/hypoxemic respiratory failure. Questionable PNA. RVP neg. Hyponatremic.  Pt's respiratory status initially improved and was on nasal oxygen. Mental status and overall clinical picture remains poor.    Events last 24 hours: acute desat to 82%, pt placed back on AVAPS, son at bedside     PAST MEDICAL & SURGICAL HISTORY:  Fall  Hypertension  Muscular dystrophy        Medications:    metoprolol tartrate Injectable 5 milliGRAM(s) IV Push every 6 hours PRN          enoxaparin Injectable 40 milliGRAM(s) SubCutaneous daily          sodium chloride 0.9%. 1000 milliLiter(s) IV Continuous <Continuous>      chlorhexidine 2% Cloths 1 Application(s) Topical <User Schedule>  ciprofloxacin  0.3% Ophthalmic Solution 1 Drop(s) Right EYE two times a day            ICU Vital Signs Last 24 Hrs  T(C): 35.6 (2019 11:43), Max: 37.2 (2019 14:48)  T(F): 96.1 (2019 11:43), Max: 99 (2019 14:48)  HR: 85 (2019 12:57) (61 - 109)  BP: 153/84 (2019 12:00) (82/51 - 160/84)  BP(mean): 112 (2019 12:00) (62 - 115)  ABP: --  ABP(mean): --  RR: 64 (2019 12:00) (12 - 64)  SpO2: 96% (2019 12:57) (79% - 100%)      ABG - ( 2019 13:12 )  pH, Arterial: 7.04  pH, Blood: x     /  pCO2: x     /  pO2: 116   / HCO3: 28    / Base Excess: 3.6   /  SaO2: 96                  I&O's Detail    2019 07:01  -  2019 07:00  --------------------------------------------------------  IN:    sodium chloride 0.9%.: 1000 mL    Solution: 250 mL    Solution: 1100 mL  Total IN: 2350 mL    OUT:    Indwelling Catheter - Urethral: 1275 mL  Total OUT: 1275 mL    Total NET: 1075 mL      2019 07:01  -  2019 13:25  --------------------------------------------------------  IN:    sodium chloride 0.9%.: 225 mL    Solution: 100 mL    Solution: 50 mL  Total IN: 375 mL    OUT:    Indwelling Catheter - Urethral: 210 mL  Total OUT: 210 mL    Total NET: 165 mL            LABS:                        14.9   5.82  )-----------( 243      ( 2019 05:27 )             48.1         132<L>  |  84<L>  |  8.0  ----------------------------<  77  4.6   |  36.0<H>  |  <0.20<L>    Ca    8.2<L>      2019 05:27  Phos  3.1     28  Mg     1.6         TPro  7.3  /  Alb  3.7  /  TBili  0.5  /  DBili  x   /  AST  28  /  ALT  13  /  AlkPhos  91  -27      CARDIAC MARKERS ( 2019 05:27 )  x     / 0.55 ng/mL / 27 U/L / x     / x      CARDIAC MARKERS ( 2019 14:32 )  x     / 0.45 ng/mL / x     / x     / x          CAPILLARY BLOOD GLUCOSE        PT/INR - ( 2019 14:32 )   PT: 13.2 sec;   INR: 1.14 ratio         PTT - ( 2019 14:32 )  PTT:24.4 sec  Urinalysis Basic - ( 2019 15:15 )    Color: Yellow / Appearance: Clear / S.010 / pH: x  Gluc: x / Ketone: Trace  / Bili: Negative / Urobili: Negative mg/dL   Blood: x / Protein: 30 mg/dL / Nitrite: Negative   Leuk Esterase: Negative / RBC: 0-2 /HPF / WBC 3-5   Sq Epi: x / Non Sq Epi: Occasional / Bacteria: x      CULTURES:  Rapid RVP Result: NotDetec ( @ 19:04)      Physical Examination:    General: No acute distress.      HEENT: partially sewn shut left eye with purulent discharge, right scleral suffusion     PULM: severely limited air entry, Clear to auscultation bilaterally, no significant sputum production, shallow ineffective breathing     NECK: Supple, no lymphadenopathy, trachea midline    CVS: Regular rate and rhythm, no murmurs, rubs, or gallops    GI: Soft, nondistended, nontender, normoactive bowel sounds, no masses    EXT: No edema, nontender, bilateral foot drop     SKIN: Warm and well perfused, no rashes noted. DTI on sacrum    NEURO: awake, eyes open does not follow commands, moving upper extremities somewhat,     DEVICES: tillman 19    RADIOLOGY:   < from: Xray Chest 1 View-PORTABLE IMMEDIATE (19 @ 15:13) >  History: Sepsis.     Date and time of exam: 2019 2:49 PM.    Technique: A single AP view of the chest was obtained.    Comparison exam: 2012.    Findings:  Cardiomegaly. No hilar or mediastinal abnormality. The lungs are clear.   No evidence of pleural effusion or pneumothorax. There are degenerative   changes in the spine..    Impression:  No evidence of acute cardiopulmonary disease..                TERA LEE M.D., ATTENDING RADIOLOGIST  This document has been electronically signed. 2019  3:32PM    < end of copied text >    CRITICAL CARE TIME SPENT: 45 minutes

## 2019-06-28 NOTE — DIETITIAN INITIAL EVALUATION ADULT. - PROBLEM SELECTOR PLAN 4
Chronic. Will start on gentle IVF. N/S 75cc/hr. Replete lytes as needed. K>4, Phos>3, Mg>2. NPO for now on BiPAP.  -when off bipap will need speech and swallow eval. if remains on bipap will need NG tube to start tube feeds to prevent further malnourishment, caution with refeeding syndrome given history of poor po intake for a while.

## 2019-06-28 NOTE — CHART NOTE - NSCHARTNOTEFT_GEN_A_CORE
Upon Nutritional Assessment by the Registered Dietitian your patient was determined to meet criteria / has evidence of the following diagnosis/diagnoses:          [ ]  Mild Protein Calorie Malnutrition        [ ]  Moderate Protein Calorie Malnutrition        [x] Severe Protein Calorie Malnutrition        [ ] Unspecified Protein Calorie Malnutrition        [ ] Underweight / BMI <19        [ ] Morbid Obesity / BMI > 40    Pt presents at high nutrition risk secondary to malnutrition (severe, acute) related to inability to meet sufficient protein-energy in setting of muscular dystrophy and lethargy/poor appetite PTA as evidenced by meeting <50% nutrient needs >5 days, severe muscle loss of temples, clavicles, shoulders, and severe fat loss of orbitals and buccal pads.    Goal/Expected Outcome Pt will meet >75% of estimated protein-energy needs.  Findings as based on:  •  Comprehensive nutrition assessment and consultation  •  Calorie counts (nutrient intake analysis)  •  Food acceptance and intake status from observations by staff  •  Follow up  •  Patient education  •  Intervention secondary to interdisciplinary rounds  •   concerns      Treatment:    The following has been recommended:    1) Recommend SLP swallow evaluation prior to diet advancement.  2) If pt deemed unsafe for po intake, recommend initiation of enteral nutrition- Jevity 1.5, initiated at 10 ml/hr and advance 10ml q6 hrs until goal rate of 55 ml/hr (x20 hrs) to provide 1100 ml, 1650 kcal, 70g protein, 836 ml free water, and 100% of RDIs for vitamins/minerals. Additional free water per MD discretion. *Add Prostat 30 ml BID to provide an additional 200 kcal and 30g protein.   4) Rx: MVI and vit C 500mg daily as feasible.    PROVIDER Section:     By signing this assessment you are acknowledging and agree with the diagnosis/diagnoses assigned by the Registered Dietitian    Comments:

## 2019-06-28 NOTE — DIETITIAN INITIAL EVALUATION ADULT. - ADD RECOMMEND
Recommend SLP swallow evaluation prior to diet advancement. If pt deemed unsafe for po intake, recommend initiation of enteral nutrition- Jevity 1.5, initiated at 10 ml/hr and advance 10ml q6 hrs until goal rate of 55 ml/hr (x20 hrs) to provide 1100 ml, 1650 kcal, 70g protein, 836 ml free water, and 100% of RDIs for vitamins/minerals. Additional free water per MD discretion. Add Prostat 30 ml BID to provide an additional 200 kcal and 30g protein. Rx: MVI and vit C 500mg daily as feasible.

## 2019-06-28 NOTE — PROGRESS NOTE ADULT - PROBLEM SELECTOR PLAN 6
pt was eating by mouth at home but has had less and less intake in the last week or so, overall severely wasted not able to swallow at this point with compromised respiratory status and poor mental status

## 2019-06-28 NOTE — DIETITIAN INITIAL EVALUATION ADULT. - PERTINENT LABORATORY DATA
06-28 Na132 mmol/L<L> Glu 77 mg/dL K+ 4.6 mmol/L Cr  <0.20 mg/dL<L> BUN 8.0 mg/dL Phos 3.1 mg/dL Alb n/a   PAB n/a

## 2019-06-28 NOTE — CONSULT NOTE ADULT - ASSESSMENT
73 M with h/o muscular dystrophy, CAD, h/o fall with ?ICH/TBI with residual short term memory deficits, [place] admitted for failure to thrive, acute respiratory failure with hypercapneia and hypoxemia, hypovolemic hyponatremia. Palliative consulted for GOC.     PLAN    Advance Muscular Dystrophy  - worsening respiratory status likely related to progression of disease  - WC/Bed bound, frail, cachetic, mostly dependent of ADLs  - overall guarded prognosis    Acute Respiratory Failure with Hypoxemia/Hypercapnia  - improved, O2NC at 3L, respiratory status is tenuous  - family indicated pt would never had wanted trach    Dysphagia  - remains NPO, swallow eval when pt's mentation improved/more awake, aspiration precaution    ?Bacterial Conjunctivitis  - purulent discharge noted on admission, c/w opthalmic antibiotic    Palliative Care Encounter  Met with pt's next of kin/surrogate son Felton at bedside to introduce role and scope of palliative care. Son is primary caregiver, has support of aunt who lives in [facility]. Son states that pt had for most part been stable until about 1 week ago, able to engage in conversation but at times easily forgetful since fall ~4-5 yrs ago (which was his last known hospitalization), requires assistance with most ADLs, WC/bed bound. We spoke about disease course of muscular dystrophy (which son states notable changes when pt was in mid-30s). Son aware pt's prognosis is very guarded, high risk of decompensation especially from a respiratory status standpoint. He indicated talking to his aunt last evening about heroic life sustaining interventions (CPR/intubation/trach) who encourage son to allow pt to pass naturally as it would not change his underlying issues. Son became very emotional and tearful. Son indicated that pt would not have wanted to be kept alive as a vegetable or trach/LTC placement. We reviewed MOLST together. Son to discuss it over with his Aunt before making any further decisions. Will continue to follow along for support and ongoing goc.

## 2019-06-28 NOTE — PROGRESS NOTE ADULT - SUBJECTIVE AND OBJECTIVE BOX
Patient is a 73y old  Male who presents with a chief complaint of Acute hypoxic/hypercarbic respiratory failure (2019 16:12)      BRIEF HOSPITAL COURSE: ***    Events last 24 hours: ***      PAST MEDICAL & SURGICAL HISTORY:  Fall  Hypertension  Muscular dystrophy    Allergies    No Known Allergies    Intolerances      FAMILY HISTORY:      Review of Systems:  CONSTITUTIONAL: No fever, chills, or fatigue  EYES: No eye pain, visual disturbances, or discharge  ENMT:  No difficulty hearing, tinnitus, vertigo; No sinus or throat pain  NECK: No pain or stiffness  RESPIRATORY: No cough, wheezing, chills or hemoptysis; No shortness of breath  CARDIOVASCULAR: No chest pain, palpitations, dizziness, or leg swelling  GASTROINTESTINAL: No abdominal or epigastric pain. No nausea, vomiting, or hematemesis; No diarrhea or constipation. No melena or hematochezia.  GENITOURINARY: No dysuria, frequency, hematuria, or incontinence  NEUROLOGICAL: No headaches, memory loss, loss of strength, numbness, or tremors  SKIN: No itching, burning, rashes, or lesions   MUSCULOSKELETAL: No joint pain or swelling; No muscle, back, or extremity pain  PSYCHIATRIC: No depression, anxiety, mood swings, or difficulty sleeping      Social History: ***      Medications:  ampicillin/sulbactam  IVPB 3 Gram(s) IV Intermittent every 6 hours  azithromycin  IVPB 500 milliGRAM(s) IV Intermittent every 24 hours            enoxaparin Injectable 40 milliGRAM(s) SubCutaneous daily          magnesium sulfate  IVPB 2 Gram(s) IV Intermittent once  sodium chloride 0.9%. 1000 milliLiter(s) IV Continuous <Continuous>      chlorhexidine 2% Cloths 1 Application(s) Topical <User Schedule>  ciprofloxacin  0.3% Ophthalmic Solution 1 Drop(s) Right EYE two times a day            ICU Vital Signs Last 24 Hrs  T(C): 35.2 (2019 07:35), Max: 37.2 (2019 14:48)  T(F): 95.4 (2019 07:35), Max: 99 (2019 14:48)  HR: 83 (2019 07:00) (61 - 98)  BP: 121/61 (2019 07:00) (82/51 - 147/82)  BP(mean): 84 (2019 07:00) (62 - 109)  ABP: --  ABP(mean): --  RR: 12 (2019 07:00) (12 - 25)  SpO2: 100% (2019 07:00) (79% - 100%)    Vital Signs Last 24 Hrs  T(C): 35.2 (2019 07:35), Max: 37.2 (2019 14:48)  T(F): 95.4 (2019 07:35), Max: 99 (2019 14:48)  HR: 83 (2019 07:00) (61 - 98)  BP: 121/61 (2019 07:00) (82/51 - 147/82)  BP(mean): 84 (2019 07:00) (62 - 109)  RR: 12 (2019 07:00) (12 - 25)  SpO2: 100% (2019 07:00) (79% - 100%)    ABG - ( 2019 00:18 )  pH, Arterial: 7.43  pH, Blood: x     /  pCO2: 57    /  pO2: 91    / HCO3: 35    / Base Excess: 11.3  /  SaO2: 98                  I&O's Detail    2019 07:01  -  2019 07:00  --------------------------------------------------------  IN:    sodium chloride 0.9%.: 1000 mL    Solution: 250 mL    Solution: 1100 mL  Total IN: 2350 mL    OUT:    Indwelling Catheter - Urethral: 1275 mL  Total OUT: 1275 mL    Total NET: 1075 mL      2019 07:01  -  2019 08:33  --------------------------------------------------------  IN:  Total IN: 0 mL    OUT:    Indwelling Catheter - Urethral: 150 mL  Total OUT: 150 mL    Total NET: -150 mL            LABS:                        14.9   5.82  )-----------( 243      ( 2019 05:27 )             48.1     06-28    132<L>  |  84<L>  |  8.0  ----------------------------<  77  4.6   |  36.0<H>  |  <0.20<L>    Ca    8.2<L>      2019 05:27  Phos  3.1       Mg     1.6         TPro  7.3  /  Alb  3.7  /  TBili  0.5  /  DBili  x   /  AST  28  /  ALT  13  /  AlkPhos  91        CARDIAC MARKERS ( 2019 05:27 )  x     / 0.55 ng/mL / 27 U/L / x     / x      CARDIAC MARKERS ( 2019 14:32 )  x     / 0.45 ng/mL / x     / x     / x          CAPILLARY BLOOD GLUCOSE        PT/INR - ( 2019 14:32 )   PT: 13.2 sec;   INR: 1.14 ratio         PTT - ( 2019 14:32 )  PTT:24.4 sec  Urinalysis Basic - ( 2019 15:15 )    Color: Yellow / Appearance: Clear / S.010 / pH: x  Gluc: x / Ketone: Trace  / Bili: Negative / Urobili: Negative mg/dL   Blood: x / Protein: 30 mg/dL / Nitrite: Negative   Leuk Esterase: Negative / RBC: 0-2 /HPF / WBC 3-5   Sq Epi: x / Non Sq Epi: Occasional / Bacteria: x      CULTURES:        Physical Examination:    General: No acute distress.  Alert, oriented, interactive, nonfocal    HEENT: Pupils equal, reactive to light.  Symmetric.    PULM: Clear to auscultation bilaterally, no significant sputum production    CVS: Regular rate and rhythm, no murmurs, rubs, or gallops    ABD: Soft, nondistended, nontender, normoactive bowel sounds, no masses    EXT: No edema, nontender    SKIN: Warm and well perfused, no rashes noted.    NEURO: A&Ox3, strength 5/5 all extremities, cranial nerves grossly intact, no focal deficits      RADIOLOGY: ***        CRITICAL CARE TIME SPENT: ***  Time spent evaluating/treating patient with medical issues that pose a high risk for life threatening deterioration and/or end-organ damage, reviewing data/labs/imaging, discussing case with multidisciplinary team, discussing plan/goals of care with patient/family. Non-inclusive of procedure time. 72 y/o M with a h/o HTN, CAD s/p PCI, hx of fall with questionable ICH vs TBI per son, Muscular Dystrophy, admitted on 6/27 after 1 week of loss of voice, eye drainage, decreased PO intake, worsening dyspnea, fatigue and overall failure to thrive. Placed on NIPPV for acute hypercapnic/hypoxemic respiratory failure. Questionable PNA. RVP neg. Hyponatremic.    Repeat blood gas demonstrate that Mr. Martin is ventilating more effectively, however, he appears to be clinically deteriorating. He has periods where he can barely pull any tidal volume at all and rapidly desaturates into the 70s/80s. Switched from BiPAP to AVAPS to provide additional support, actively titrating settings to maintain SaO2 > 92%. He remains at very high risk for intubation. This appears to be more progression of his muscular dystrophy than it does any sort of acute lung pathology. His CXR was officially read as unremarkable, but empiric pneumonia abx coverage continues. Normal WBC, afebrile. Will f/u procalcitonin. Consider deescalation.    Trend NIF if able to obtain.    Hypotension improved with IVF resuscitation- likely hypovolemia related, doubt sepsis.    Serum Na: 121, likely hypovolemic hyponatremia from poor PO intake. Urine studies sent. Continue 0.9% NS infusion. Trend BMP. Avoid overcorrection.     Needs palliative care consultation and goals of care discussion. If he ends up being intubated he will more than likely never liberate from the ventilator and a tracheostomy will be necessary.          CRITICAL CARE TIME SPENT: 36 mins  Time spent evaluating/treating patient with medical issues that pose a high risk for life threatening deterioration and/or end-organ damage, reviewing data/labs/imaging, discussing case with multidisciplinary team, discussing plan/goals of care with patient/family. Non-inclusive of procedure time.

## 2019-06-28 NOTE — DIETITIAN INITIAL EVALUATION ADULT. - ETIOLOGY
related to inability to meet sufficient protein-energy in setting of muscular dystrophy and lethargy/poor appetite PTA

## 2019-06-28 NOTE — DIETITIAN INITIAL EVALUATION ADULT. - OTHER INFO
74 y/o male with PMHx HTN, CAD s/p PCI, hx of fall with questionable ICH vs TBI per son, and Muscular Dystrophy presents after 1 week of loss of voice, eye drainage, decreased PO intake, worsening dyspnea, fatigue and overall failure to thrive. Pt lethargic at time of interview, unable to provide nutrition hx, no family at bedside. Per H&P, pt has been with poor po intake PTA and "barely" eating anything per pts son. Pt currently NPO. Noted with suspected DTI to sacrum. Aware Palliative consulted for GOC. 74 y/o male with PMHx HTN, CAD s/p PCI, hx of fall with questionable ICH vs TBI per son, and Muscular Dystrophy presents after 1 week of loss of voice, eye drainage, decreased PO intake, worsening dyspnea, fatigue and overall failure to thrive. Pt lethargic at time of interview, unable to provide nutrition hx, no family at bedside. Per H&P, pt has been with poor po intake PTA and "barely eating" anything per pts son. Pt currently NPO. Noted with suspected DTI to sacrum. Aware Palliative consulted for GOC.

## 2019-06-28 NOTE — PROGRESS NOTE ADULT - PROBLEM SELECTOR PLAN 7
palliative care consulted, pt's son at bedside when pt's respirations changed and oxygen saturation dropped, discussion held with son Sukhdeep regarding vent support, tracheostomy, feeding tubes and pt's overall clinical picture. He stated that he spoke to his aunt last night and they both firmly believe that pt would not want to be kept alive artificially and that he would not want another trach as he had one in the past (when he had TBI). I explained that pt may be actively dying at this point, emotional supports offered, he asked that I call pt's sister Gilbert Ortega- left message on voicemail to urgently call back, await return call. Dr. Westbrook met with pt's son as well and is coming back for added support, son stated he has no other real supports except his mother who is not involved in pt's care.

## 2019-06-28 NOTE — DIETITIAN INITIAL EVALUATION ADULT. - PROBLEM SELECTOR PLAN 3
Daily NIF/Vital capacity. NIF < 30 indicative of poor cough reflex and failure to protect airway significantly increasing risk for intubation. Support respiratory function as above. Close monitoring for signs of respiratory compromise.  -consult palliative care.

## 2019-06-28 NOTE — CONSULT NOTE ADULT - SUBJECTIVE AND OBJECTIVE BOX
PALLIATIVE CONSULT    CC: Patient is a 73y old  Male who presents with a chief complaint of Acute hypoxic/hypercarbic respiratory failure (2019 08:33)    HPI:  Mr. Martin is a 73 year old male with PMHx of CAD s/p PCI, h/o fall (~4-5 yrs with ?ICH vs TBI with subsequent residual short term memory deficit per son), progressive muscular dystrophy, [place] admitted on  for failure to thrive, generalized weakness, lost of voice and eye drainage. Pt is a poor historian, info from chart review, medical staff and son Felton. ROS + for anorexia. Per son, at baseline pt is wheelchair/bed bound, able to verbalize needs but requires assistance with most ADLs including (bathing, toileting, feeding). In ER, pt noted to have acute respiratory failure with hypoxia and hypercapnia placed on BIPAP, hypovolemic hyponatremia and admitted to MICU. Etiology likely related to progression of muscular dystrophy, ?PNA started on empiric antibiotic and subsequently de-escalated. Respiratory status did improve but remains tenuous presently on O2NC 3L. Palliative consulted for support and goc in setting of likely progression of muscular dystrophy     Unable to perform ROS due to nonverbal    PERTINENT PMH REVIEWED: Yes     PAST MEDICAL & SURGICAL HISTORY:  Fall  Hypertension  Muscular dystrophy      SOCIAL HISTORY:    Admitted from:  home, son lives in home and is primary caregiver  - son is only child/surrogate decision maker: Felton [phone]    Baseline ADLs (prior to admission):   Dependent   [place]: 40-50 %    Surrogate/HCP/Guardian:     ADVANCE DIRECTIVES: FULL CODE  DNR YES NO  Completed on:                     MOLST  YES NO   Completed on:  Living Will  YES NO   Completed on:    FAMILY HISTORY:       Allergies    No Known Allergies    Intolerances    MEDICATIONS  (STANDING):  chlorhexidine 2% Cloths 1 Application(s) Topical <User Schedule>  ciprofloxacin  0.3% Ophthalmic Solution 1 Drop(s) Right EYE two times a day  enoxaparin Injectable 40 milliGRAM(s) SubCutaneous daily  sodium chloride 0.9%. 1000 milliLiter(s) (75 mL/Hr) IV Continuous <Continuous>    MEDICATIONS  (PRN):  metoprolol tartrate Injectable 5 milliGRAM(s) IV Push every 6 hours PRN SBP >160      PHYSICAL EXAM:    Vital Signs Last 24 Hrs  T(C): 35.6 (2019 11:43), Max: 37.2 (2019 14:48)  T(F): 96.1 (2019 11:43), Max: 99 (2019 14:48)  HR: 96 (2019 12:00) (61 - 109)  BP: 153/84 (2019 12:00) (82/51 - 160/84)  BP(mean): 112 (2019 12:00) (62 - 115)  RR: 64 (2019 12:00) (12 - 64)  SpO2: 98% (2019 12:00) (79% - 100%)    General: thin, cachetic. Resting comfortably. No acute distress.   HEENT: mucous membrane dry   Lungs: ctab. non-labored. O2NC 3L   CV: +s1/s2. Regular rate and rhythm.    GI:+ bowel sound. abdomen soft, non-tender, non-distended.  MSK: No cyanosis or edema. weakness. bed/WC bound  Neuro: nonfocal. Tries to open eyes to verbal stimuli. nonverbal   :  [place]  Skin: warm and dry.      LABS:                        14.9   5.82  )-----------( 243      ( 2019 05:27 )             48.1         132<L>  |  84<L>  |  8.0  ----------------------------<  77  4.6   |  36.0<H>  |  <0.20<L>    Ca    8.2<L>      2019 05:27  Phos  3.1       Mg     1.6         TPro  7.3  /  Alb  3.7  /  TBili  0.5  /  DBili  x   /  AST  28  /  ALT  13  /  AlkPhos  91  -    PT/INR - ( 2019 14:32 )   PT: 13.2 sec;   INR: 1.14 ratio         PTT - ( 2019 14:32 )  PTT:24.4 sec  Urinalysis Basic - ( 2019 15:15 )    Color: Yellow / Appearance: Clear / S.010 / pH: x  Gluc: x / Ketone: Trace  / Bili: Negative / Urobili: Negative mg/dL   Blood: x / Protein: 30 mg/dL / Nitrite: Negative   Leuk Esterase: Negative / RBC: 0-2 /HPF / WBC 3-5   Sq Epi: x / Non Sq Epi: Occasional / Bacteria: x    RADIOLOGY & ADDITIONAL STUDIES:     CXR 19  Findings:  Cardiomegaly. No hilar or mediastinal abnormality. The lungs are clear.   No evidence of pleural effusion or pneumothorax. There are degenerative   changes in the spine..    Impression:  No evidence of acute cardiopulmonary disease..

## 2019-06-28 NOTE — PROGRESS NOTE ADULT - PROBLEM SELECTOR PLAN 1
Secondary to progression of muscular dystrophy, acute on chronic hypoventilation. now worsening ABG done after acute desaturation on nasal oxygen pts pCO2 is undetectable- pt's son at bedside does not want pt to be intubated if it means tracheostomy as he had one in the past. Explained to the son that his father's condition is worsening and overall if he were to be intubated it would likely end with trach/peg and nursing home which is not something that is in line with his wishes

## 2019-06-28 NOTE — PROGRESS NOTE ADULT - PROBLEM SELECTOR PLAN 5
likely multifactorial toxic metabolic from electrolyte derangement, hypoactive delirium, CO2 narcosis   pt put back on BIPAP/AVAPs

## 2019-06-28 NOTE — DIETITIAN INITIAL EVALUATION ADULT. - PROBLEM SELECTOR PLAN 2
-POCUS lungs a line predominant. poor cardiac windows, IVC visualized via abdominal view showed significant respiratory variation.   -bolused additional 1 liter IVF for 3100 liters IVF total. if remains hypotensive may need vasopressor support. Hypotension likely secondary to failure to thrive versus sepsis.

## 2019-06-28 NOTE — GOALS OF CARE CONVERSATION - PERSONAL ADVANCE DIRECTIVE - CONVERSATION DETAILS
[place] palliative physician met with patients catina Barraza to provide support in coping with end of life planning for patient. Patients sister coming soon to hospital and wants natural death.

## 2019-06-28 NOTE — DIETITIAN INITIAL EVALUATION ADULT. - PROBLEM SELECTOR PLAN 1
Secondary to progression of muscular dystrophy, acute on chronic hypoventilation. Initial ABG on NRB 7.29/99/234/38. ABG now on BiPAP remains acutely hypercarbic, 7.27/90/96/34. Although CXR official read shows no evidence of acute cardiopulmonary disease, still concerning for LLL PNA, especially in setting of dehydration/malnourishment. Possible aspiration given history of increasing AMS, fatigue, and somnelence. Will cover for CAP empirically on Azith for atypical, 500g daily and Unasyn 3g Q6h. Will check legionella studies. Full Sepsis w/u. Lactate normal. RVP and Pan cx's sent.  Repeat CXR in the morning. Will remain on BiPAP to support WOB and prevent further respiratory muscle fatigue. bipap settings adjusted to 20/6 @40% as f/u abg shows persistent hypercapnia. Actively titrating fio2 and peep to maintain spo2 > 92%. Pt is high risk for respiratory collapse and possible intubation. Will admit to ICU for close monitoring. HOB >30 degrees. NPO for now. Procal sent.  -trop mildly elevated likely secondary to demand ischemia. no ischemic changes on EKG. will f/u cardiac enzymes with morning labs.

## 2019-06-28 NOTE — DIETITIAN INITIAL EVALUATION ADULT. - MALNUTRITION
Limited NFPE: severe muscle loss of temples, clavicles, shoulders, and severe fat loss of orbitals and buccal pads severe, acute

## 2019-06-29 NOTE — PROGRESS NOTE ADULT - REASON FOR ADMISSION
Acute hypoxic/hypercarbic respiratory failure

## 2019-06-29 NOTE — PROGRESS NOTE ADULT - ASSESSMENT
72 y/o male with PMHx HTN, CAD s/p PCI, hx of fall with questionable ICH vs TBI per son, and Muscular Dystrophy presents to [facility] ED after 1 week of loss of voice, eye drainage, decreased PO intake, worsening dyspnea, fatigue now with acute hypoxic/hypercapnic respiratory failure and failure to thrive secondary to progressive muscular dystrophy.
74 y/o male with acute on chronic hypoxic/hypercapnic respiratory failure from progression of muscular dystrophy, Hx of CAD, TBI?

## 2019-06-29 NOTE — PROGRESS NOTE ADULT - SUBJECTIVE AND OBJECTIVE BOX
CC: Resp. failure  HPI: 74 y/o male initially admitted to MICU for hypoxic/hypercapnic resp. failure due to progression of muscular dystrophy. Patient with hx of TBI? HTN, CAD who was having progressive decline at home with decreased PO intake, and worsening resp. failure. Patient in ICU with worsening resp status despite NIV. Based on patients advanced illness and resp. status, patient made comfort care after extensive discussion with family and patient wishes to not be on long term life support. Patient at this time has been taken off NIV, started on comfort care measures.

## 2019-06-29 NOTE — DISCHARGE NOTE FOR THE EXPIRED PATIENT - HOSPITAL COURSE
This is 74 y/o male initially admitted to MICU for hypoxic/hypercapnic resp. failure due to progression of muscular dystrophy. Patient with hx of TBI? HTN, CAD who was having progressive decline at home with decreased PO intake, and worsening resp. failure. Patient in ICU with worsening resp status despite NIV. Based on patients advanced illness and resp. status, patient made comfort care after extensive discussion with family and patient wishes to not be on long term life support. Patient was taken off NIV, started on comfort care measures. PT was pronounced dead at 09:13 AM    Son and exa wife present bedside

## 2019-06-29 NOTE — PROGRESS NOTE ADULT - PROBLEM SELECTOR PLAN 3
Likely from chronic malnourishment, lack of solute and dehydration  rapidly corrected over the course of 24hours
due to advancing disease process

## 2019-06-29 NOTE — PROGRESS NOTE ADULT - PROBLEM SELECTOR PLAN 1
Cont. comfort care measures with Ativan/Morphine. Currently with no signs of resp. distress, or surrogate signs of pain. Await transfer out of MICU to private room. Cont. comfort care measures with Ativan/Morphine. Currently with no signs of resp. distress, or surrogate signs of pain. Await transfer out of MICU to private room. DC labs draws, no vitals.

## 2019-06-29 NOTE — PROGRESS NOTE ADULT - PROBLEM SELECTOR PLAN 2
progression>? vs debility/deconditioning  support for all ADLs   per son pt has been declining for years and more acutely over the past weeks
Plan as above

## 2019-07-02 LAB
CULTURE RESULTS: SIGNIFICANT CHANGE UP
CULTURE RESULTS: SIGNIFICANT CHANGE UP
SPECIMEN SOURCE: SIGNIFICANT CHANGE UP
SPECIMEN SOURCE: SIGNIFICANT CHANGE UP

## 2020-01-15 NOTE — ED ADULT NURSE NOTE - NSFALLRSKPASTHIST_ED_ALL_ED
Alert and oriented to person, place, time/situation. normal mood and affect. no apparent risk to self or others. no

## 2020-11-30 NOTE — PATIENT PROFILE ADULT - HAS THE PATIENT EXPERIENCED ANY OF THE FOLLOWING WITHIN THE WEEK PRIOR TO ADMISSION?
Subjective:   Patient ID: Varinder Salazar is a 37 y.o. male.    Chief Complaint: Arm Pain (right) and Otalgia      Arm Pain   The incident occurred more than 1 week ago. There was no injury mechanism. The pain is present in the right elbow. The quality of the pain is described as aching. The pain radiates to the right arm. The pain is at a severity of 6/10. The pain is moderate. The pain has been constant since the incident. Pertinent negatives include no chest pain, muscle weakness, numbness or tingling. The symptoms are aggravated by movement, palpation and lifting. He has tried NSAIDs, rest and ice for the symptoms. The treatment provided mild relief.     37-year-old male with recurring pain in right elbow.  No trauma.  Some brief improvement with past anti-inflammatories.    Also with some recurrent right ear pain.  Has had recent otitis externa.  Improved briefly with Cipro/dexamethasone drops      Patient queried and denies any further complaints.    ALLERGIES AND MEDICATIONS: updated and reviewed.  Review of patient's allergies indicates:  No Known Allergies    Current Outpatient Medications:     multivitamin capsule, Take 1 capsule by mouth once daily., Disp: , Rfl:     ciprofloxacin-dexamethasone 0.3-0.1% (CIPRODEX) 0.3-0.1 % DrpS, Place 4 drops into both ears 2 (two) times daily., Disp: 7.5 mL, Rfl: 2    cyclobenzaprine (FLEXERIL) 10 MG tablet, Take 1 tablet by mouth daily as needed., Disp: , Rfl:     nabumetone (RELAFEN) 500 MG tablet, Take 1 tablet (500 mg total) by mouth 2 (two) times daily. X 3-10 days for pain and inflammation; take w food and water, Disp: 20 tablet, Rfl: 0    Review of Systems   Constitutional: Negative for activity change, chills, diaphoresis, fatigue and fever.   HENT: Negative for congestion, postnasal drip, sinus pressure, sinus pain and sore throat.    Respiratory: Negative for cough, chest tightness and shortness of breath.    Cardiovascular: Negative for chest pain and  palpitations.   Gastrointestinal: Negative for abdominal pain, nausea and vomiting.   Genitourinary: Negative for dysuria.   Musculoskeletal: Negative for arthralgias, back pain and myalgias.   Skin: Negative for rash.   Neurological: Negative for dizziness, tingling, weakness, numbness and headaches.   Psychiatric/Behavioral: Negative for dysphoric mood and sleep disturbance. The patient is not nervous/anxious.        Lab Results   Component Value Date    WBC 9.50 08/24/2020    HGB 14.1 08/24/2020    HCT 44.7 08/24/2020    MCV 90 08/24/2020     08/24/2020         CMP  Sodium   Date Value Ref Range Status   08/24/2020 140 136 - 145 mmol/L Final     Potassium   Date Value Ref Range Status   08/24/2020 4.4 3.5 - 5.1 mmol/L Final     Chloride   Date Value Ref Range Status   08/24/2020 107 95 - 110 mmol/L Final     CO2   Date Value Ref Range Status   08/24/2020 23 23 - 29 mmol/L Final     Glucose   Date Value Ref Range Status   08/24/2020 106 70 - 110 mg/dL Final     BUN   Date Value Ref Range Status   08/24/2020 15 6 - 20 mg/dL Final     Creatinine   Date Value Ref Range Status   08/24/2020 1.1 0.5 - 1.4 mg/dL Final     Calcium   Date Value Ref Range Status   08/24/2020 8.9 8.7 - 10.5 mg/dL Final     Total Protein   Date Value Ref Range Status   08/24/2020 7.1 6.0 - 8.4 g/dL Final     Albumin   Date Value Ref Range Status   08/24/2020 4.1 3.5 - 5.2 g/dL Final     Total Bilirubin   Date Value Ref Range Status   08/24/2020 0.2 0.1 - 1.0 mg/dL Final     Comment:     For infants and newborns, interpretation of results should be based  on gestational age, weight and in agreement with clinical  observations.  Premature Infant recommended reference ranges:  Up to 24 hours.............<8.0 mg/dL  Up to 48 hours............<12.0 mg/dL  3-5 days..................<15.0 mg/dL  6-29 days.................<15.0 mg/dL       Alkaline Phosphatase   Date Value Ref Range Status   08/24/2020 72 55 - 135 U/L Final     AST   Date  "Value Ref Range Status   08/24/2020 44 (H) 10 - 40 U/L Final     ALT   Date Value Ref Range Status   08/24/2020 38 10 - 44 U/L Final     Anion Gap   Date Value Ref Range Status   08/24/2020 10 8 - 16 mmol/L Final     eGFR if    Date Value Ref Range Status   08/24/2020 >60.0 >60 mL/min/1.73 m^2 Final     eGFR if non    Date Value Ref Range Status   08/24/2020 >60.0 >60 mL/min/1.73 m^2 Final     Comment:     Calculation used to obtain the estimated glomerular filtration  rate (eGFR) is the CKD-EPI equation.           Lab Results   Component Value Date    HGBA1C 5.5 08/24/2020        Objective:     Vitals:    11/30/20 1553   BP: 124/80   Pulse: 101   Temp: 98.3 °F (36.8 °C)   TempSrc: Oral   SpO2: 98%   Weight: 106.5 kg (234 lb 12.6 oz)   Height: 5' 7" (1.702 m)   PainSc:   4   PainLoc: Arm     Body mass index is 36.77 kg/m².    Physical Exam  Vitals signs and nursing note reviewed.   HENT:      Head: Normocephalic and atraumatic.   Musculoskeletal:      Right shoulder: Normal.      Left shoulder: Normal.      Right elbow: He exhibits normal range of motion, no swelling, no effusion, no deformity and no laceration. Tenderness found. Lateral epicondyle tenderness noted.      Left elbow: Normal.      Right wrist: Normal.      Left wrist: Normal.      Right hand: Normal.      Left hand: Normal.   Neurological:      Mental Status: He is alert.   Psychiatric:         Mood and Affect: Mood normal.         Behavior: Behavior normal.         Assessment and Plan:   Varinder was seen today for arm pain and otalgia.    Diagnoses and all orders for this visit:    Chronic pain of right elbow  -     Ambulatory referral/consult to Orthopedics; Future  -     X-Ray Elbow Complete Right; Future    Lateral epicondylitis, unspecified laterality    Chronic otitis externa of right ear, unspecified type    Other orders  -     nabumetone (RELAFEN) 500 MG tablet; Take 1 tablet (500 mg total) by mouth 2 (two) times " daily. X 3-10 days for pain and inflammation; take w food and water  -     ciprofloxacin-dexamethasone 0.3-0.1% (CIPRODEX) 0.3-0.1 % DrpS; Place 4 drops into both ears 2 (two) times daily.      Time spent in the evaluation and management of this patient exceeded 45min and greater than 50% of this time was in face-to-face education regarding diagnoses, medications, plan, and follow-up.    No follow-ups on file.    THIS NOTE WILL BE SHARED WITH THE PATIENT.           no

## 2022-03-11 NOTE — ED ADULT TRIAGE NOTE - HEIGHT IN INCHES
Writer reviewed discharge paperwork with patient. All questions answered. IV removed with no complications. Patient taken to the lobby via wheelchair.     3/11/2022  Caden Thapa RN 8

## 2022-06-21 NOTE — CONSULT NOTE ADULT - ATTENDING COMMENTS
D/W MONY Joe Dr., RN    Spent 35 mins on advance care planning and goc discussion.      Thank you for the opportunity to assist with the care of this patient.   [facility] [phone]. Universal Safety Interventions

## 2022-09-23 NOTE — PATIENT PROFILE ADULT - PRO INTERPRETER NEED 2
[id number] - [facility] [address]. [place] [phone] - F [phone] ([phone]    Spoke with: Left     Warfarin 5 mg tablets     #54 tablets (30 days supply)    Sig: Take warfarin 10 mg daily except 7.5 mg on Thursdays or as directed by the clinic. MR x 3     Dr. Iesha Camp  [facility]# [id number]      Tashi Patton [facility]  Anticoagulation Clinic  [address]   Abel Kingston   [phone]
Other

## 2023-04-12 NOTE — ED PROVIDER NOTE - NS ED ATTENDING STATEMENT MOD
Attending Only Siliq Counseling:  I discussed with the patient the risks of Siliq including but not limited to new or worsening depression, suicidal thoughts and behavior, immunosuppression, malignancy, posterior leukoencephalopathy syndrome, and serious infections.  The patient understands that monitoring is required including a PPD at baseline and must alert us or the primary physician if symptoms of infection or other concerning signs are noted. There is also a special program designed to monitor depression which is required with Siliq.

## 2023-10-03 NOTE — ED PROVIDER NOTE - RESPIRATORY NEGATIVE STATEMENT, MLM
Attempted to contact pt, on home number, the wireless customer is not available. Called mobile number, LMOM for pt to contact the office. no cough and no shortness of breath.

## 2025-04-05 NOTE — PATIENT PROFILE ADULT - MEDICATIONS/VISITS
of water.    Single occurrence of reflux into pharynx is seen during study with thin liquids. Reflux did not result in penetration/aspiration.    Recommend diet of soft and bite sized diet/thin liquids - straws ok. Small, single sips. Medications as tolerated. Ensure pt is in an upright positioning for all intake and remains upright for 30-45 minutes post-intake. Would recommend continued dysphagia tx during IP stay to address these deficits. RN notified. SLP team to continue POC.     Managing Reflux:      What is reflux? Reflux is when stomach contents come back up into your esophagus and can enter your throat (pharynx) and possibly your airway (larynx/trachea). This can affect and injure the tissues in your esophagus, throat, vocal cords, and possibly lungs.      How can I manage my reflux?         Change your diet: Decrease the intake of reflux triggering foods. These include citrus fruits, garlic, onions, tomatoes, chocolate, spice/fried/fatty foods, coffee/caffeinated beverages, and carbonated beverages.        Change your eating habits: Eat smaller more frequent meals, remain upright for at least 30 minutes following a meal, and wait for 2-3 hours after eating before lying flat or doing heavy physical activities.       Make lifestyle changes: Limited alcohol intake, decrease/quit smoking, weight loss, and manage stress.       Posture: When sleeping elevate the head of your bed 6-12 inches, use a pillow wedge to elevate your head, sleep on your left side.        Medications: Some medications can make reflux worse. Speak to your health care provided about these medications. Your healthcare provider may also prescribe medications to decrease the production of acid in your stomach to reduce reflux.             Patient Complaints/Reason for Referral:  Duarte Reyes was referred for a MBS to assess the efficiency of his/her swallow function, assess for aspiration, and to make recommendations regarding safe dietary  no